# Patient Record
Sex: MALE | Race: WHITE
[De-identification: names, ages, dates, MRNs, and addresses within clinical notes are randomized per-mention and may not be internally consistent; named-entity substitution may affect disease eponyms.]

---

## 2017-01-24 ENCOUNTER — HOSPITAL ENCOUNTER (EMERGENCY)
Dept: HOSPITAL 25 - UCCORT | Age: 27
Discharge: HOME | End: 2017-01-24
Payer: COMMERCIAL

## 2017-01-24 VITALS — SYSTOLIC BLOOD PRESSURE: 133 MMHG | DIASTOLIC BLOOD PRESSURE: 76 MMHG
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
## 2017-01-24 DIAGNOSIS — S93.401A: Primary | ICD-10-CM

## 2017-01-24 PROCEDURE — 99212 OFFICE O/P EST SF 10 MIN: CPT

## 2017-01-24 PROCEDURE — G0463 HOSPITAL OUTPT CLINIC VISIT: HCPCS

## 2017-01-24 NOTE — UC
Lower Extremity/Ankle HPI





- HPI Summary


HPI Summary: 





rolled right ankle 2d ago while playing basketball. Has sprained this ankle 

multiple times, this pain feels different. Foot went forward and hyperflexed 

during injury. Can bear weight, but painful. Would like XRay since it feels 

different from prior sprains.





- History of Current Complaint


Chief Complaint: UCLowerExtremity


Stated Complaint: RIGHT ANKLE INJURY


Time Seen by Provider: 01/24/17 16:05


Hx Obtained From: Patient


Onset/Duration: Sudden Onset, Lasting Days - 2


Severity Initially: Mild


Severity Currently: Mild


Aggravating Factor(s): Ambulation


Alleviating Factor(s): Rest, Elevation, Ice


Able to Bear Weight: Yes - limping gait





- Risk Factors


Gout Risk Factors: Male


DVT Risk Factors: Negative


Septic Arthritis Risk Factor: Negative





- Allergies/Home Medications


Allergies/Adverse Reactions: 


 Allergies











Allergy/AdvReac Type Severity Reaction Status Date / Time


 


No Known Allergies Allergy   Verified 01/24/17 15:58











Home Medications: 


 Home Medications





Ibuprofen TAB* [Advil TAB*] 400 mg PO Q6H PRN 01/24/17 [History Confirmed 01/24/ 17]


Multiple Vitamin [Multi Vitamin] 1 tab PO DAILY 01/24/17 [History Confirmed 01/ 24/17]











PMH/Surg Hx/FS Hx/Imm Hx


Endocrine History Of: Reports: Diabetes, Thyroid Disease - s/p thyroidectomy





- Surgical History


Surgical History: Yes


Surgery Procedure, Year, and Place: Thyroidectomy, 2015, [place]; Right Shoulder

, 2010, [place]





- Family History


Known Family History: Positive: Hypertension


   Negative: Diabetes





- Social History


Occupation: Employed Full-time


Lives: With Family


Alcohol Use: Weekly


Substance Use Type: None


Smoking Status (MU): Never Smoked Tobacco





- Immunization History


Most Recent Influenza Vaccination: Not the 2016/2017 Season





Review of Systems


Constitutional: Negative


Skin: Negative


Eyes: Negative


ENT: Negative


Respiratory: Negative


Cardiovascular: Negative


Gastrointestinal: Negative


Genitourinary: Negative


Motor: Negative


Neurovascular: Negative


Musculoskeletal: Arthralgia, Edema, Myalgia


Neurological: Negative


Psychological: Negative


All Other Systems Reviewed And Are Negative: Yes





Physical Exam


Triage Information Reviewed: Yes


Appearance: Well-Appearing, No Pain Distress, Well-Nourished


Vital Signs: 


 Initial Vital Signs











Temp  98.5 F   01/24/17 15:47


 


Pulse  80   01/24/17 15:47


 


Resp  14   01/24/17 15:47


 


BP  133/76   01/24/17 15:47


 


Pulse Ox  100   01/24/17 15:47











Vital Signs Reviewed: Yes


Eye Exam: Normal


Respiratory Exam: Normal


Cardiovascular Exam: Normal


Musculoskeletal Exam: Other - tenderness and mild swelling over lateral 

malleolus. Bruising beneath medial malleolus. No pain at base 5th MT or dorsum 

foot.


Neurological Exam: Normal


Psychological Exam: Normal


Skin Exam: Normal





Diagnostics





- Laboratory


Diagnostic Studies Completed/Ordered: xray neg





Lower Extremity Course/Dx





- Differential Dx/Diagnosis


Differential Diagnosis/HQI/PQRI: Fracture (Closed), Sprain


Provider Diagnoses: ankle sprain





Discharge





- Discharge Plan


Condition: Stable


Disposition: HOME


Patient Education Materials:  Ankle Sprain (ED), Ankle Stirrup Splint (ED), 

Ankle Exercises (GEN)


Referrals: 


Non Staff,Doctor [Primary Care Provider] -

## 2017-01-24 NOTE — RAD
INDICATION: Right ankle injury     



COMPARISON: None



TECHNIQUE: AP, lateral, and oblique views were obtained.



FINDINGS: The bony structures, joint spaces, and soft tissues are normal for age.



IMPRESSION: NEGATIVE EXAMINATION

## 2017-02-28 ENCOUNTER — HOSPITAL ENCOUNTER (EMERGENCY)
Dept: HOSPITAL 25 - UCCORT | Age: 27
Discharge: HOME | End: 2017-02-28
Payer: COMMERCIAL

## 2017-02-28 VITALS — DIASTOLIC BLOOD PRESSURE: 57 MMHG | SYSTOLIC BLOOD PRESSURE: 101 MMHG

## 2017-02-28 DIAGNOSIS — J32.9: Primary | ICD-10-CM

## 2017-02-28 DIAGNOSIS — Z79.4: ICD-10-CM

## 2017-02-28 DIAGNOSIS — H10.31: ICD-10-CM

## 2017-02-28 DIAGNOSIS — E11.9: ICD-10-CM

## 2017-02-28 PROCEDURE — G0463 HOSPITAL OUTPT CLINIC VISIT: HCPCS

## 2017-02-28 PROCEDURE — 99212 OFFICE O/P EST SF 10 MIN: CPT

## 2017-02-28 NOTE — UC
Throat Pain/Nasal Ben HPI





- HPI Summary


HPI Summary: 





sinus pain and pressure x 10 days , + nasal congestion , cough , no fever, no 

chills


+ right eye redness, no eye pain , no changed in vision 





- History of Current Complaint


Chief Complaint: UCRespiratory


Stated Complaint: SINUS COMPLAINT


Time Seen by Provider: 02/28/17 08:11


Hx Obtained From: Patient


Onset/Duration: Gradual Onset, Lasting Days - 10, Still Present


Severity: Moderate


Cough: Nonproductive


Associated Signs & Symptoms: Positive: Sinus Discomfort, Nasal Discharge.  

Negative: Fever, Vomiting





- Allergies/Home Medications


Allergies/Adverse Reactions: 


 Allergies











Allergy/AdvReac Type Severity Reaction Status Date / Time


 


environmental Allergy  Congestion Uncoded 02/28/17 08:27














PMH/Surg Hx/FS Hx/Imm Hx


Endocrine History Of: Reports: Diabetes - insulin, Thyroid Disease - s/p 

thyroidectomy





- Surgical History


Surgical History: Yes


Surgery Procedure, Year, and Place: Thyroidectomy, 2015, [place]; Right Shoulder

, 2010, [facility]





- Family History


Known Family History: Positive: Hypertension


   Negative: Diabetes





- Social History


Alcohol Use: Weekly


Substance Use Type: None


Smoking Status (MU): Never Smoked Tobacco





- Immunization History


Most Recent Influenza Vaccination: Not the 2016/2017 Season





Review of Systems


Constitutional: Negative


Skin: Negative


Eyes: Drainage, Eye Redness


ENT: Nasal Discharge


Respiratory: Cough


Cardiovascular: Negative


Gastrointestinal: Negative


All Other Systems Reviewed And Are Negative: Yes





Physical Exam


Triage Information Reviewed: Yes


Appearance: Well-Appearing, No Pain Distress, Well-Nourished


Vital Signs: 


 Initial Vital Signs











Temp  98.1 F   02/28/17 08:16


 


Pulse  81   02/28/17 08:16


 


Resp  18   02/28/17 08:16


 


BP  101/57   02/28/17 08:16


 


Pulse Ox  98   02/28/17 08:16











Vital Signs Reviewed: Yes


Eyes: Positive: Conjunctiva Inflamed - right eye.  Negative: Discharge


ENT: Positive: Normal ENT inspection, Hearing grossly normal, Pharyngeal 

erythema, Nasal congestion, Nasal drainage, TMs normal


Dental Exam: Normal


Neck: Positive: Supple, Nontender, No Lymphadenopathy


Respiratory: Positive: Chest non-tender, Lungs clear, Normal breath sounds


Cardiovascular Exam: Normal


Cardiovascular: Positive: RRR, No Murmur, Pulses Normal


Skin Exam: Normal





Throat Pain/Nasal Course/Dx





- Differential Dx/Diagnosis


Provider Diagnoses: sinusitis.  conjunctivitis





Discharge





- Discharge Plan


Condition: Stable


Disposition: HOME


Prescriptions: 


Amoxicillin/Clavulanate TAB* [Augmentin *] 875 mg PO BID #20 tab


Tobramycin 0.3% OPHTH.SOL* 1 drop BOTH EYES Q4H #1 btl


Patient Education Materials:  Sinusitis (ED), Conjunctivitis (ED)


Referrals: 


Non Staff,Doctor [Primary Care Provider] - 


Additional Instructions: 


follow up with your pcp in 7 days

## 2018-03-23 ENCOUNTER — HOSPITAL ENCOUNTER (EMERGENCY)
Dept: HOSPITAL 25 - UCCORT | Age: 28
Discharge: HOME | End: 2018-03-23
Payer: COMMERCIAL

## 2018-03-23 VITALS — DIASTOLIC BLOOD PRESSURE: 75 MMHG | SYSTOLIC BLOOD PRESSURE: 115 MMHG

## 2018-03-23 DIAGNOSIS — J06.9: Primary | ICD-10-CM

## 2018-03-23 DIAGNOSIS — E11.9: ICD-10-CM

## 2018-03-23 PROCEDURE — 99211 OFF/OP EST MAY X REQ PHY/QHP: CPT

## 2018-03-23 PROCEDURE — 87651 STREP A DNA AMP PROBE: CPT

## 2018-03-23 PROCEDURE — G0463 HOSPITAL OUTPT CLINIC VISIT: HCPCS

## 2018-03-23 NOTE — UC
Throat Pain/Nasal Ben HPI





- HPI Summary


HPI Summary: 





26 yo male with sore throat x 4 days


mild URI symptoms





no fever





he has been exposed to strep











- History of Current Complaint


Chief Complaint: UCRespiratory


Stated Complaint: ST, COUGH, CONGESTION


Time Seen by Provider: 03/23/18 15:41


Hx Obtained From: Patient


Onset/Duration: Sudden Onset


Severity: Moderate


Pain Intensity: 4


Pain Scale Used: 0-10 Numeric


Cough: None


Associated Signs & Symptoms: Positive: Nasal Discharge





- Epiglottits Risk Factors


Epiglottis Risk Factors: Negative





- Allergies/Home Medications


Allergies/Adverse Reactions: 


 Allergies











Allergy/AdvReac Type Severity Reaction Status Date / Time


 


environmental Allergy  Congestion Uncoded 03/23/18 15:27














PMH/Surg Hx/FS Hx/Imm Hx


Previously Healthy: Yes


Endocrine History: Diabetes


Cancer History: Other


Other Cancer History: papillary thyroid CA





- Surgical History


Surgical History: Yes


Surgery Procedure, Year, and Place: Thyroidectomy, 2015, [place]; Right Shoulder

, 2010, [facility]





- Family History


Known Family History: Positive: Hypertension, Diabetes





- Social History


Alcohol Use: Weekly


Substance Use Type: None


Smoking Status (MU): Never Smoked Tobacco





- Immunization History


Most Recent Influenza Vaccination: Not the 2016/2017 Season





Review of Systems


Constitutional: Negative


Skin: Negative


Eyes: Negative


ENT: Sore Throat, Nasal Discharge


Respiratory: Cough


Cardiovascular: Negative


Gastrointestinal: Negative


Genitourinary: Negative


Motor: Negative


Neurovascular: Negative


Musculoskeletal: Negative


Neurological: Negative


Psychological: Negative


Is Patient Immunocompromised?: No


All Other Systems Reviewed And Are Negative: Yes





Physical Exam


Triage Information Reviewed: Yes


Appearance: Well-Appearing, No Pain Distress, Well-Nourished


Vital Signs: 


 Initial Vital Signs











Temp  99 F   03/23/18 15:25


 


Pulse  91   03/23/18 15:25


 


Resp  16   03/23/18 15:25


 


BP  115/75   03/23/18 15:25


 


Pulse Ox  98   03/23/18 15:25











Vital Signs Reviewed: Yes


Eyes: Positive: Conjunctiva Clear


ENT: Positive: Hearing grossly normal, Pharyngeal erythema.  Negative: Nasal 

congestion, Nasal drainage, Tonsillar swelling, Tonsillar exudate, Trismus, 

Muffled voice, Sinus tenderness, Uvula midline


Neck: Positive: Supple, Nontender, No Lymphadenopathy


Respiratory: Positive: Lungs clear, Normal breath sounds, No respiratory 

distress, No accessory muscle use


Cardiovascular: Positive: RRR, No Murmur


Musculoskeletal: Positive: ROM Intact, No Edema


Neurological: Positive: Alert


Psychological Exam: Normal


Skin Exam: Normal





Diagnostics





- Laboratory


Diagnostic Studies Completed/Ordered: strep (-)





Throat Pain/Nasal Course/Dx





- Differential Dx/Diagnosis


Provider Diagnoses: viral URI





Discharge





- Sign-Out/Discharge


Documenting (check all that apply): Discharge





- Discharge Plan


Condition: Stable


Disposition: HOME


Patient Education Materials:  Viral Syndrome (ED)


Referrals: 


Non Staff,Doctor [Primary Care Provider] - 


Additional Instructions: 


RECHECK FOR NEW OR WORSENING SYMPTOMS





recheck in 3 days if not better





your strep test was (-)

















- Billing Disposition and Condition


Condition: STABLE


Disposition: HOME